# Patient Record
Sex: FEMALE | NOT HISPANIC OR LATINO | URBAN - METROPOLITAN AREA
[De-identification: names, ages, dates, MRNs, and addresses within clinical notes are randomized per-mention and may not be internally consistent; named-entity substitution may affect disease eponyms.]

---

## 2017-08-23 ENCOUNTER — OFFICE VISIT - RIVER FALLS (OUTPATIENT)
Dept: FAMILY MEDICINE | Facility: CLINIC | Age: 37
End: 2017-08-23

## 2017-08-23 ASSESSMENT — MIFFLIN-ST. JEOR: SCORE: 1018.54

## 2017-08-24 LAB
CREAT SERPL-MCNC: 0.66 MG/DL (ref 0.5–1.1)
GLUCOSE BLD-MCNC: 103 MG/DL (ref 65–99)
HBA1C MFR BLD: 5.3 %
LDLC SERPL CALC-MCNC: 124 MG/DL

## 2017-08-28 ENCOUNTER — TRANSFERRED RECORDS (OUTPATIENT)
Dept: HEALTH INFORMATION MANAGEMENT | Facility: CLINIC | Age: 37
End: 2017-08-28

## 2017-08-28 LAB — HPV ABSTRACT: NORMAL

## 2017-09-13 ENCOUNTER — OFFICE VISIT - RIVER FALLS (OUTPATIENT)
Dept: FAMILY MEDICINE | Facility: CLINIC | Age: 37
End: 2017-09-13

## 2017-09-13 ASSESSMENT — MIFFLIN-ST. JEOR: SCORE: 1014.92

## 2017-09-26 ENCOUNTER — OFFICE VISIT - RIVER FALLS (OUTPATIENT)
Dept: FAMILY MEDICINE | Facility: CLINIC | Age: 37
End: 2017-09-26

## 2018-02-13 ENCOUNTER — OFFICE VISIT - RIVER FALLS (OUTPATIENT)
Dept: FAMILY MEDICINE | Facility: CLINIC | Age: 38
End: 2018-02-13

## 2018-07-31 ENCOUNTER — RECORDS - HEALTHEAST (OUTPATIENT)
Dept: ADMINISTRATIVE | Facility: OTHER | Age: 38
End: 2018-07-31

## 2018-10-08 ENCOUNTER — RECORDS - HEALTHEAST (OUTPATIENT)
Dept: ADMINISTRATIVE | Facility: OTHER | Age: 38
End: 2018-10-08

## 2018-10-12 ENCOUNTER — ANESTHESIA - HEALTHEAST (OUTPATIENT)
Dept: SURGERY | Facility: CLINIC | Age: 38
End: 2018-10-12

## 2018-10-15 ENCOUNTER — SURGERY - HEALTHEAST (OUTPATIENT)
Dept: SURGERY | Facility: CLINIC | Age: 38
End: 2018-10-15

## 2018-10-15 ASSESSMENT — MIFFLIN-ST. JEOR: SCORE: 969.51

## 2018-10-22 ENCOUNTER — OFFICE VISIT - RIVER FALLS (OUTPATIENT)
Dept: FAMILY MEDICINE | Facility: CLINIC | Age: 38
End: 2018-10-22

## 2018-10-22 ENCOUNTER — COMMUNICATION - RIVER FALLS (OUTPATIENT)
Dept: FAMILY MEDICINE | Facility: CLINIC | Age: 38
End: 2018-10-22

## 2018-10-22 LAB
CREAT SERPL-MCNC: 0.72 MG/DL (ref 0.57–1.11)
GLUCOSE BLD-MCNC: 80 MG/DL (ref 65–100)

## 2021-03-29 ENCOUNTER — RECORDS - HEALTHEAST (OUTPATIENT)
Dept: ADMINISTRATIVE | Facility: OTHER | Age: 41
End: 2021-03-29

## 2021-05-24 ENCOUNTER — RECORDS - HEALTHEAST (OUTPATIENT)
Dept: ADMINISTRATIVE | Facility: CLINIC | Age: 41
End: 2021-05-24

## 2021-06-02 VITALS — WEIGHT: 91.44 LBS | HEIGHT: 58 IN | BODY MASS INDEX: 19.2 KG/M2

## 2021-06-21 NOTE — ANESTHESIA POSTPROCEDURE EVALUATION
Patient: Dorie Palma  OPEN REDUCTION INTERNAL FIXATION LEFT DISTAL RADIUS  Anesthesia type: general    Patient location: Phase II Recovery  Last vitals:   Vitals:    10/15/18 1000   BP: 96/59   Pulse: 60   Resp: 16   Temp:    SpO2: 99%     Post vital signs: stable  Level of consciousness: awake, alert and oriented  Post-anesthesia pain: pain controlled  Post-anesthesia nausea and vomiting: Nausea when moves her head.   Pulmonary: unassisted, return to baseline  Cardiovascular: stable and blood pressure at baseline  Hydration: adequate  Anesthetic events: no    QCDR Measures:  ASA# 11 - Cris-op Cardiac Arrest: ASA11B - Patient did NOT experience unanticipated cardiac arrest  ASA# 12 - Cris-op Mortality Rate: ASA12B - Patient did NOT die  ASA# 13 - PACU Re-Intubation Rate: NA - No ETT / LMA used for case  ASA# 10 - Composite Anes Safety: ASA10A - No serious adverse event    Additional Notes:  Pt has not stood up yet, but when positioned upright she says that she feels a little dizzy and nauseated.  Scopolamine patch has helped her int he past and this was placed per her request.  Pt's SBP normally runs in the low 90s and has some mild hypotension.  Will continue with IVF bolus and let her rest.

## 2021-06-21 NOTE — ANESTHESIA PREPROCEDURE EVALUATION
Anesthesia Evaluation      Patient summary reviewed   No history of anesthetic complications     Airway   Mallampati: I  Neck ROM: full   Pulmonary - negative ROS and normal exam    breath sounds clear to auscultation                         Cardiovascular - negative ROS and normal exam  Exercise tolerance: > or = 4 METS  (-) murmur  ECG reviewed  Rhythm: regular  Rate: normal,    no murmur      Neuro/Psych - negative ROS     Endo/Other - negative ROS      GI/Hepatic/Renal    (+) GERD well controlled,        Other findings: Left distal radius fracture      Dental - normal exam                        Anesthesia Plan  Planned anesthetic: general mask  Left infraclavicular nerve block for post operative pain control  ASA 1   Induction: intravenous   Anesthetic plan and risks discussed with: patient  Anesthesia plan special considerations: antiemetics,   Post-op plan: routine recovery

## 2021-06-21 NOTE — ANESTHESIA CARE TRANSFER NOTE
Last vitals:   Vitals:    10/15/18 0903   BP: 93/61   Pulse: 68   Resp: 16   Temp: 36.7  C (98  F)   SpO2: 99%     Patient's level of consciousness is drowsy  Spontaneous respirations: yes  Maintains airway independently: yes  Dentition unchanged: yes  Oropharynx: oropharynx clear of all foreign objects    QCDR Measures:  ASA# 20 - Surgical Safety Checklist: WHO surgical safety checklist completed prior to induction  PQRS# 430 - Adult PONV Prevention: 4558F - Pt received => 2 anti-emetic agents (different classes) preop & intraop  ASA# 8 - Peds PONV Prevention: NA - Not pediatric patient, not GA or 2 or more risk factors NOT present  PQRS# 424 - Cris-op Temp Management: 4559F - At least one body temp DOCUMENTED => 35.5C or 95.9F within required timeframe  PQRS# 426 - PACU Transfer Protocol: - Transfer of care checklist used  ASA# 14 - Acute Post-op Pain: ASA14B - Patient did NOT experience pain >= 7 out of 10

## 2021-07-03 NOTE — ADDENDUM NOTE
Addendum Note by Ally Warner MD at 10/15/2018 11:11 AM     Author: Ally Warner MD Service: -- Author Type: Physician    Filed: 10/15/2018 11:11 AM Date of Service: 10/15/2018 11:11 AM Status: Signed    : Ally Warner MD (Physician)       Addendum  created 10/15/18 1111 by Ally Warner MD    Sign clinical note

## 2022-02-11 VITALS
WEIGHT: 90.8 LBS | HEART RATE: 80 BPM | DIASTOLIC BLOOD PRESSURE: 60 MMHG | SYSTOLIC BLOOD PRESSURE: 86 MMHG | BODY MASS INDEX: 18.65 KG/M2 | TEMPERATURE: 98 F

## 2022-02-11 VITALS
HEART RATE: 68 BPM | BODY MASS INDEX: 20.32 KG/M2 | WEIGHT: 100.8 LBS | DIASTOLIC BLOOD PRESSURE: 66 MMHG | HEIGHT: 59 IN | TEMPERATURE: 98.2 F | SYSTOLIC BLOOD PRESSURE: 98 MMHG

## 2022-02-11 VITALS
HEART RATE: 78 BPM | TEMPERATURE: 97.9 F | BODY MASS INDEX: 20.48 KG/M2 | HEIGHT: 59 IN | DIASTOLIC BLOOD PRESSURE: 58 MMHG | WEIGHT: 101.6 LBS | SYSTOLIC BLOOD PRESSURE: 102 MMHG

## 2022-02-11 VITALS
WEIGHT: 99.4 LBS | SYSTOLIC BLOOD PRESSURE: 98 MMHG | HEART RATE: 86 BPM | BODY MASS INDEX: 20.42 KG/M2 | DIASTOLIC BLOOD PRESSURE: 60 MMHG | TEMPERATURE: 98.2 F

## 2022-02-15 NOTE — PROCEDURES
Accession Number:       [id number]  CLINICAL INFORMATION::     Normal exam  LMP::     08/08/2017  PREV. PAP::     2014  PREV. BX::     NONE GIVEN  SOURCE::     Cervix, Endocervix  STATEMENT OF ADEQUACY::     Satisfactory for evaluation. Endocervical/transformation zone component present.  INTERPRETATION/RESULT::     Negative for intraepithelial lesion or malignancy.  COMMENT::     This Pap test has been evaluated with computer assisted technology.  CYTOTECHNOLOGIST::     BOWEN LITTLE(ASCP) CT Screening location: [place] [address]  HPV mRNA E6/E7:     Not Detected       This test was performed using the APTIMA HPV Assay ([employer]       This assay detects E6/E7 viral messenger RNA (mRNA) from 14       high-risk HPV types (16,18,31,33,35,39,45,51,52,56,58,59,66,68).

## 2022-02-15 NOTE — PROGRESS NOTES
Patient:   STEPHON CRAWFORD            MRN: [id number]            FIN: [id number]               Age:   38 years     Sex:  Female     :  1980   Associated Diagnoses:   Acute UTI; Dysuria; Nausea   Author:   Judith Ragsdale      Chief Complaint   10/22/2018 6:29 PM CDT   Pt c/o nausea, lightheaded, hematuria, chills, loss of appetite, vomiting at times - sour. Chest pain at times. Hand surgery a week ago. Did take laxative this morning d/t constipation from previous pain meds from surgery.        History of Present Illness   interesting case.  Pt has long hx of abdominal complaints rashawn since cholecystectomy in , she has been seen here three times in past with various abd issues.  Her primary care is plit between [facility] and .  about 3 weeks ago she ahs having vaginitis symptoms and went into see Dr Griffin at .  was told she had vaginitis and to use OTC monistat which she did for 7d.  during this time she fell and broke her left wrist necessitating surgery with titanium jose placement, this occured one week ago.  AFter surgery she used narcs for pain control for about 3d but they caused nausea and constipation, she stopped using them 4d ago.  Her mild nausea continued and  told her to increase fluids but she has not felt like she can drink too much because of nausea.  She has not vomitted, she developed right lower abd cramping, fatigue and dysuria but yesterday noted blood in her urine  she was concerned with constipation and took a laxative this morning with large bowel movement, soft but then nausea worsened and she used a left over zofran to help with this      Review of Systems   Constitutional:  Negative.    Ear/Nose/Mouth/Throat:  Negative.    Respiratory:  Negative.    Cardiovascular:  Negative.    Gastrointestinal:  Nausea, Constipation, Abdominal pain.    Genitourinary:  Negative except as documented in history of present illness.    Gynecologic:  Negative.    Hematology/Lymphatics:   Negative.    Endocrine:  Negative.    Immunologic:  Negative.    Musculoskeletal:  Negative.    Integumentary:  Negative except as documented in history of present illness.    Neurologic:  Negative.    Psychiatric:  Negative.       Health Status   Allergies:    Allergic Reactions (Selected)  Severity Not Documented  Metoclopramide (Cardiac arrest)   Medications:  (Selected)   Prescriptions  Prescribed  ondansetron 4 mg oral tablet, disintegratin tab(s) ( 4 mg ), PO, q8 hrs, # 30 tab(s), 1 Refill(s), Type: Maintenance, Pharmacy: [id number], 1 tab(s) po q8 hrs  Documented Medications  Documented  Omega-3 oral capsule: 0 Refill(s), Type: Maintenance  Vitamin B12: 0 Refill(s), Type: Maintenance  Vitamin D3: 0 Refill(s), Type: Maintenance  slippery elm supplement: slippery elm supplement, Supply, 0 Refill(s), Type: Maintenance  turmeric: 0 Refill(s), Type: Maintenance   Problem list:    All Problems  H/O adenomatous polyp of colon / SNOMED CT [id number] / Confirmed      Histories   Past Medical History:    Active  H/O adenomatous polyp of colon ([id number])      Physical Examination   Vital Signs   10/22/2018 6:29 PM CDT Temperature Tympanic 98.0 DegF    Peripheral Pulse Rate 80 bpm    Pulse Site Radial artery    HR Method Manual    Systolic Blood Pressure 86 mmHg  LOW    Diastolic Blood Pressure 60 mmHg    Mean Arterial Pressure 69 mmHg    BP Site Right arm    BP Method Manual      Measurements from flowsheet : Measurements   10/22/2018 6:29 PM CDT   Weight Measured - Standard                90.8 lb     General:  Alert and oriented, No acute distress, able to move around without difficulty, able to climb on and off table without difficulty.    Eye:  Pupils are equal, round and reactive to light.    HENT:  Normocephalic.    Neck:  Supple, Non-tender.    Respiratory:  Lungs are clear to auscultation.    Cardiovascular:  Normal rate.    Gastrointestinal:  Soft.         Abdomen: Right, Lower quadrant,  RLQ tenderness with palpation, negative HSM, great bowel sounds throughout, No rebound tenderness.         Bowel sounds: All four quadrants, No bruit present.    Genitourinary:  No costovertebral angle tenderness, No inguinal tenderness.    Lymphatics:  No lymphadenopathy neck, axilla, groin.    Musculoskeletal:  Normal range of motion, left wrist velcro splint removed, incision site appears to be healing well, no induration or redness.    Integumentary:  Warm, Dry, Pink.    Neurologic:  Alert, Oriented, Normal sensory.    Psychiatric:  Cooperative, Appropriate mood & affect.       Review / Management   Results review:  Lab results   10/22/2018 6:46 PM CDT UA pH 5.5    UA Specific Gravity 1.015    UA Glucose NEGATIVE    UA Bilirubin NEGATIVE    UA Ketones 3+    Urine Occult Blood 3+    UA Protein NEGATIVE    UA Nitrite NEGATIVE    UA Leukocyte Esterase 2+   , reviewed micor, cbc and BMP all of which were available for me to review with pt and her   BMP and cbc normal.       Impression and Plan   Diagnosis     Acute UTI (LCO69-IO N39.0).     Acute UTI (JGA72-KQ N39.0).     Dysuria (JZS74-VZ R30.0).     Nausea (RID12-ZL R11.0).     Plan:  push fluids, use zofran if needed but be cautious as this can lead to constipation  it is possible she had nausea and constipation from narc use but this should improve the further she is from this therapy  the fatigue, hematuria and micro all point to UTI, I have explained the difference between UTI and vaginitis.  I have also cautioned her not to over treat her vagina.  if vaginal symptoms have not resolved a vaginal exam should eb repeated but her UTI sould be treated first, culture is pending  if there is fever or change in urine stream she should be seen immediately  can use gingerae for nausea  be gentle with stomach.    Orders     Orders (Selected)   Prescriptions  Prescribed  Macrobid 100 mg oral capsule: = 1 cap(s) ( 100 mg ), PO, BID, # 14 cap(s), 0 Refill(s), Type:  Maintenance, Pharmacy: [id number], 1 cap(s) Oral bid,x7 day(s).

## 2022-02-15 NOTE — PROGRESS NOTES
Chief Complaint    Patient is here for nausea, diarrhea. Concerned about having stomach flu. c/o dizziness and vomitting.  History of Present Illness      Patient is in clinic today with her .  She has had 3 days of several loose watery brown bowel movements with ongoing nausea and some emesis yesterday.  She is got some decreased appetite but reports that she has had no decrease in urine output.  She has had just 2-3 loose bowel movements today.  Sometimes she feels like she needs to have a bowel movement but when she goes to the toilet she does not pass any stool.  There is quite a bit of generalized abdominal cramping and discomfort.  She has 1 child with similar symptoms however 2 other children and her  that can account.  She denies any fever sore throat or runny nose.  Her Child seems to be recovering from this faster than she is.  She would very much like something to help with the nausea.       Review of systems is as per HPI and otherwise negative       Exam general patient is mildly ill-appearing but in no acute distress she is alert and oriented ×3       HEENT pupils are equally round and reactive to light, extraocular motion intact, conjunctiva is not injected, hearing is grossly normal and mucous membranes are moist and pink and she has no rhinorrhea nares are patent       Chest is clear to auscultation bilaterally without wheezes rhonchi or rails cardiovascular there is a regular rate and rhythm without murmurs gallops or rubs she has an S1 and S2       Abdomen is soft diffusely mildly tender but no rebound or guarding she has mildly hyperactive bowel sounds skin is warm and dry there is no rash she has normal skin turgor       Assessment and plan suspect gastroenteritis as the cause of her nausea vomiting and diarrhea.  Encouraged patient to monitor urine output and to increase her p.o. fluid intake and watch for evidence of dehydration.  For the nausea would recommend some ondansetron  as needed if her symptoms worsen or do not improve then she can certainly return to clinic.  Counseled patient that would not recommend doing any stool studies at this time but would reconsider this if her symptoms worsened or changed or had not improved after 1-2 weeks.  Physical Exam   Vitals & Measurements    T: 98.2(Tympanic)  HR: 86(Peripheral)  BP: 98/60     WT: 99.4 lb   Assessment/Plan       Gastroenteritis         Ordered:          ondansetron, 1 tab(s) ( 4 mg ), PO, q8 hrs, # 30 tab(s), 1 Refill(s), Type: Maintenance, Pharmacy: [id number], 1 tab(s) po q8 hrs          [id number] office outpatient visit 25 minutes (Charge), Quantity: 1, Gastroenteritis  Nausea                Nausea         Ordered:          ondansetron, 1 tab(s) ( 4 mg ), PO, q8 hrs, # 30 tab(s), 1 Refill(s), Type: Maintenance, Pharmacy: [facility] [id number], 1 tab(s) po q8 hrs          [id number] office outpatient visit 25 minutes (Charge), Quantity: 1, Gastroenteritis  Nausea           Patient Information     Name:STEPHON CRAWFORD      Address:      [address] [place] [id number]     Sex:Female     YOB: 1980     [phone]     Emergency Contact:JAGUAR HITCHCOCK     MRN:[id number]     FIN:[id number]     [facility]     Date of Service:2018      Primary Care Physician:       ERICK GARVIN, [phone]  Problem List/Past Medical History    Ongoing     H/O adenomatous polyp of colon    Historical  Procedure/Surgical History     Colonoscopy (2017)     Cholecystectomy (2015)  Medications        Omega-3 oral capsule: 0 Refill(s).        ondansetron 4 mg oral tablet, disintegratin mg, 1 tab(s), PO, q8 hrs, 30 tab(s), 1 Refill(s).        turmeric: 0 Refill(s).                Allergies    metoclopramide (Cardiac arrest)  Social History    Smoking Status - 2018     Never smoker     Alcohol - 2017      Never     Employment and Education - 2017       Employed, Work/School description: RN.     Exercise and Physical Activity - 08/23/2017      Exercise frequency: every other day. Exercise type: gardening.     Home and Environment - 08/23/2017      Spouse/Partner name: Jose Shetty. Risks in environment: owns secured gun.     Sexual - 08/23/2017      Sexually active: Yes. Sexual orientation: Heterosexual. Contraceptive Use Details: None.     Substance Abuse - 08/23/2017      Never     Tobacco - 08/23/2017      Never smoker  Immunizations      Vaccine Date Status      influenza virus vaccine, inactivated 10/16/2015 Recorded      influenza virus vaccine, inactivated 10/21/2014 Recorded      influenza virus vaccine, inactivated 12/03/2013 Recorded  Lab Results      Results (Last 90 days)      No results located.

## 2022-02-15 NOTE — PROGRESS NOTES
Patient:   STEPHON CRAWFORD            MRN: [id number]            FIN: [id number]               Age:   37 years     Sex:  Female     :  1980   Associated Diagnoses:   Bowel habit changes   Author:   Jorge Zamora MD      Visit Information      Date of Service: 2017 06:00 pm  Performing Location: [facility]  Encounter#: [id number]      Primary Care Provider (PCP):  ERICK GARVIN      Referring Provider:  Jorge Zamora MD    NPI# [id number]      Chief Complaint   2017 6:06 PM CDT    Colonoscopy consult        History of Present Illness   Had cholecystectomy in . Had acid reflux starting in . Has five children.  Sometimes stool is pencil thin. Sometimes constipated alternating with diarrhea. These symptoms increased since the cholecystectomy. Taking tumeric and seems to help with acid reflux and cramping. Has cramping after she eats with bloating. Bowel changes present for about 3 years but increased recently. Has mid lower abdominal pain with palpation for the past 10 years. Diet is good and eats alot of organic.  Has had iron deficiency anemia in the past. Recent Hgb 12 in 2017.  Grandma  from colon cancer age 78.  Uncle with rectal cancer age 45.  No alcohol, no smoking, minimal coffee, no soda and 1/2 cup tea a day. Denies NSAIDs except for menstrual cramping.  Denies rectal bleeding.      Review of Systems   Constitutional:  No fever.    Respiratory:  No shortness of breath.    Cardiovascular:  No chest pain.    Gastrointestinal:  No vomiting.    Hematology/Lymphatics:  No bruising tendency, No bleeding tendency.      No history of bleeding disorders or blood transfusion  No prior problems with anesthesia  No family history of anesthesia problems  No positive responses for sleep apnea  Denies plavix, coumadin  Denies history of DVT/PE  Denies recent corticosteroid use    Able to walk a flight of stairs without chest pain or shortness of breath.      Health  Status   Allergies:    Allergic Reactions (Selected)  Severity Not Documented  Metoclopramide (Cardiac arrest)   Medications:  (Selected)   Documented Medications  Documented  Omega-3 oral capsule: 0 Refill(s), Type: Maintenance  turmeric: 0 Refill(s), Type: Maintenance   Problem list:    No problem items selected or recorded.      Histories   Procedure history:    Cholecystectomy (SNOMED CT 19143614) in 2015 at 35 Years.   Social History: . Work as a  nurse and postpartum nurse. Nonsmoker      Physical Examination   Vital Signs   9/13/2017 6:06 PM CDT Temperature Tympanic 98.2 DegF    Peripheral Pulse Rate 68 bpm    Systolic Blood Pressure 98 mmHg    Diastolic Blood Pressure 66 mmHg      General:  Alert and oriented, No acute distress.    HENT:  No pharyngeal erythema.    Neck:  No lymphadenopathy.    Respiratory:  Lungs are clear to auscultation.    Cardiovascular:  Normal rate, Regular rhythm.    Gastrointestinal:  Soft, Non-tender, Non-distended.    Musculoskeletal:  Normal gait.    Neurologic:  No focal deficits.    Psychiatric:  Appropriate mood & affect.       Impression and Plan   Diagnosis     Bowel habit changes (CIT63-AK R19.4).     Discussed risks and benefits of colonoscopy.   Declines CT abdomen for abdominal pain (present and stable for 10 years)  Recommend GERD diet and omeprazole for 2 months but wants to stick with turmeric. No alarm symptoms indicating EGD

## 2022-05-24 ENCOUNTER — HOSPITAL ENCOUNTER (EMERGENCY)
Facility: HOSPITAL | Age: 42
End: 2022-05-24